# Patient Record
Sex: FEMALE | Race: OTHER | Employment: OTHER | URBAN - METROPOLITAN AREA
[De-identification: names, ages, dates, MRNs, and addresses within clinical notes are randomized per-mention and may not be internally consistent; named-entity substitution may affect disease eponyms.]

---

## 2018-03-17 NOTE — PATIENT DISCUSSION
3/20/18 pt was unable to fill lotemax (not on ins formulary), unable to get FML ([facility] states it is backordered), so sub PF1%. Taper as dir.

## 2018-03-17 NOTE — PATIENT DISCUSSION
Recommended artificial tears to use: 1 drop 4x a day in both eyes. Sample Refresh [place] 3 PF drops given.

## 2018-03-17 NOTE — PATIENT DISCUSSION
Some inflammation. Begin Lotemax TID x 1 wk, then BID x 1 wk. Recommend UV protection to prevent growth. Monitor clinically.

## 2018-09-18 NOTE — PATIENT DISCUSSION
RTC 6 mo HVF, IOP ck. Attempted to do [facility] OCT today but was unable due to technical error. Hx of WNL CCT, IOP.

## 2018-09-18 NOTE — PATIENT DISCUSSION
Recommended artificial tears to use: 1 drop 4x a day in both eyes. Sample Refresh [place] 3 PF drops given.

## 2019-03-25 NOTE — PATIENT DISCUSSION
Recommended artificial tears to use: 1 drop 4x a day in both eyes. Sample Refresh [place] 3 PF drops given.

## 2019-04-26 NOTE — PATIENT DISCUSSION
Recommended artificial tears to use: 1 drop 4x a day in both eyes. Sample Refresh [place] 3 PF drops given.

## 2020-05-11 NOTE — PATIENT DISCUSSION
Recommended artificial tears to use: 1 drop 4x a day in both eyes. Sample Refresh [place] 3 PF drops given.

## 2021-10-22 ENCOUNTER — NEW PATIENT EMERGENCY (OUTPATIENT)
Dept: URBAN - METROPOLITAN AREA CLINIC 47 | Facility: CLINIC | Age: 61
End: 2021-10-22

## 2021-10-22 DIAGNOSIS — H47.012: ICD-10-CM

## 2021-10-22 DIAGNOSIS — M31.6: ICD-10-CM

## 2021-10-22 PROCEDURE — 92083 EXTENDED VISUAL FIELD XM: CPT

## 2021-10-22 PROCEDURE — 92250 FUNDUS PHOTOGRAPHY W/I&R: CPT

## 2021-10-22 PROCEDURE — 92004 COMPRE OPH EXAM NEW PT 1/>: CPT

## 2021-10-22 PROCEDURE — 92134 CPTRZ OPH DX IMG PST SGM RTA: CPT

## 2021-10-22 ASSESSMENT — VISUAL ACUITY
OD_CC: 20/25-1
OS_CC: 20/30-2

## 2022-04-26 ENCOUNTER — WEB ENCOUNTER (OUTPATIENT)
Dept: URBAN - METROPOLITAN AREA CLINIC 107 | Facility: CLINIC | Age: 62
End: 2022-04-26

## 2022-05-02 ENCOUNTER — OFFICE VISIT (OUTPATIENT)
Dept: URBAN - METROPOLITAN AREA CLINIC 107 | Facility: CLINIC | Age: 62
End: 2022-05-02
Payer: MEDICARE

## 2022-05-02 ENCOUNTER — LAB OUTSIDE AN ENCOUNTER (OUTPATIENT)
Dept: URBAN - METROPOLITAN AREA CLINIC 107 | Facility: CLINIC | Age: 62
End: 2022-05-02

## 2022-05-02 VITALS
RESPIRATION RATE: 18 BRPM | HEART RATE: 87 BPM | HEIGHT: 66 IN | SYSTOLIC BLOOD PRESSURE: 191 MMHG | BODY MASS INDEX: 21.38 KG/M2 | WEIGHT: 133 LBS | TEMPERATURE: 97.1 F | DIASTOLIC BLOOD PRESSURE: 128 MMHG

## 2022-05-02 DIAGNOSIS — D64.9 ANEMIA, UNSPECIFIED TYPE: ICD-10-CM

## 2022-05-02 DIAGNOSIS — K59.09 OTHER CONSTIPATION: ICD-10-CM

## 2022-05-02 DIAGNOSIS — Z12.11 COLON CANCER SCREENING: ICD-10-CM

## 2022-05-02 PROCEDURE — 99203 OFFICE O/P NEW LOW 30 MIN: CPT | Performed by: INTERNAL MEDICINE

## 2022-05-02 RX ORDER — DOCUSATE SODIUM 100 MG/1
1 CAPSULE AS NEEDED CAPSULE ORAL ONCE A DAY
Qty: 30 CAPSULE | Refills: 1 | OUTPATIENT
Start: 2022-05-02 | End: 2022-07-01

## 2022-05-02 RX ORDER — DICYCLOMINE HYDROCHLORIDE 10 MG/1
1 TABLET CAPSULE ORAL AS NEEDED
Status: ACTIVE | COMMUNITY

## 2022-05-02 RX ORDER — ARIPIPRAZOLE 2 MG/1
1 TABLET TABLET ORAL ONCE A DAY
Status: ACTIVE | COMMUNITY

## 2022-05-02 RX ORDER — CLONAZEPAM 1 MG/1
1 TABLET TABLET ORAL ONCE A DAY
Status: ACTIVE | COMMUNITY

## 2022-05-02 RX ORDER — FAMOTIDINE 20 MG/1
1 TABLET AT BEDTIME AS NEEDED TABLET, FILM COATED ORAL TWICE A DAY
Status: ACTIVE | COMMUNITY

## 2022-05-02 RX ORDER — GABAPENTIN 300 MG/1
1 CAPSULE CAPSULE ORAL TWICE A DAY
Status: ACTIVE | COMMUNITY

## 2022-05-02 RX ORDER — BUTALBITAL, ACETAMINOPHEN, AND CAFFEINE 50; 300; 40 MG/1; MG/1; MG/1
1 CAPSULE AS NEEDED CAPSULE ORAL
Status: ACTIVE | COMMUNITY

## 2022-05-02 RX ORDER — RISEDRONATE SODIUM 150 MG/1
1 TABLET AT LEAST 30 MINUTES BEFORE THE FIRST FOOD OR DRINK, OTHER THAN WATER, OF THE DAY TABLET, FILM COATED ORAL
Status: ACTIVE | COMMUNITY

## 2022-05-02 RX ORDER — PREDNISONE 20 MG/1
1 TABLET TABLET ORAL ONCE A DAY
Status: ACTIVE | COMMUNITY

## 2022-05-02 RX ORDER — MULTIVIT-MIN/IRON/FOLIC ACID/K 18-600-40
AS DIRECTED CAPSULE ORAL
Status: ACTIVE | COMMUNITY

## 2022-05-02 RX ORDER — FOLIC ACID 1 MG/1
2 TALETS TABLET ORAL ONCE A DAY
Status: ACTIVE | COMMUNITY

## 2022-05-02 RX ORDER — MULTIVITAMIN
1 TABLET TABLET ORAL ONCE A DAY
Status: ACTIVE | COMMUNITY

## 2022-05-02 RX ORDER — HYDROCODONE BITARTRATE AND ACETAMINOPHEN 10; 325 MG/1; MG/1
1 TABLET AS NEEDED TABLET ORAL
Status: ACTIVE | COMMUNITY

## 2022-05-02 RX ORDER — DULOXETINE 30 MG/1
1 CAPSULE CAPSULE, DELAYED RELEASE PELLETS ORAL ONCE A DAY
Status: ACTIVE | COMMUNITY

## 2022-05-02 RX ORDER — BIOTIN 1000 MCG
AS DIRECTED TABLET,CHEWABLE ORAL
Status: ACTIVE | COMMUNITY

## 2022-05-02 NOTE — HPI-TODAY'S VISIT:
Patient is here today to establish care.  She states that she is due for colonoscopy.  Last one was many years ago.  Her main GI complaint is constipation.  She has hard stools.  She is taking dandelion seed as a holistic treatment.  She has a bowel movement every day.  There is no blood in the stool.  No family history of colon cancer.  The patient was also found to be anemic.  She had hemoglobin 8.4 with an MCV of 86 and a normal platelet count.  She has a history of rheumatoid arthritis and is on methotrexate.  She has been on iron in the past but is not on currently as she tells me that her iron levels are good.

## 2022-06-21 ENCOUNTER — OFFICE VISIT (OUTPATIENT)
Dept: URBAN - METROPOLITAN AREA SURGERY CENTER 25 | Facility: SURGERY CENTER | Age: 62
End: 2022-06-21
Payer: MEDICARE

## 2022-06-21 DIAGNOSIS — Z12.11 COLON CANCER SCREENING: ICD-10-CM

## 2022-06-21 PROCEDURE — G0121 COLON CA SCRN NOT HI RSK IND: HCPCS | Performed by: INTERNAL MEDICINE

## 2022-06-21 PROCEDURE — G8907 PT DOC NO EVENTS ON DISCHARG: HCPCS | Performed by: INTERNAL MEDICINE

## 2022-06-21 RX ORDER — PREDNISONE 20 MG/1
1 TABLET TABLET ORAL ONCE A DAY
Status: ACTIVE | COMMUNITY

## 2022-06-21 RX ORDER — DOCUSATE SODIUM 100 MG/1
1 CAPSULE AS NEEDED CAPSULE ORAL ONCE A DAY
Qty: 30 CAPSULE | Refills: 1 | Status: ACTIVE | COMMUNITY
Start: 2022-05-02 | End: 2022-07-01

## 2022-06-21 RX ORDER — ARIPIPRAZOLE 2 MG/1
1 TABLET TABLET ORAL ONCE A DAY
Status: ACTIVE | COMMUNITY

## 2022-06-21 RX ORDER — FAMOTIDINE 20 MG/1
1 TABLET AT BEDTIME AS NEEDED TABLET, FILM COATED ORAL TWICE A DAY
Status: ACTIVE | COMMUNITY

## 2022-06-21 RX ORDER — MULTIVITAMIN
1 TABLET TABLET ORAL ONCE A DAY
Status: ACTIVE | COMMUNITY

## 2022-06-21 RX ORDER — HYDROCODONE BITARTRATE AND ACETAMINOPHEN 10; 325 MG/1; MG/1
1 TABLET AS NEEDED TABLET ORAL
Status: ACTIVE | COMMUNITY

## 2022-06-21 RX ORDER — RISEDRONATE SODIUM 150 MG/1
1 TABLET AT LEAST 30 MINUTES BEFORE THE FIRST FOOD OR DRINK, OTHER THAN WATER, OF THE DAY TABLET, FILM COATED ORAL
Status: ACTIVE | COMMUNITY

## 2022-06-21 RX ORDER — GABAPENTIN 300 MG/1
1 CAPSULE CAPSULE ORAL TWICE A DAY
Status: ACTIVE | COMMUNITY

## 2022-06-21 RX ORDER — DICYCLOMINE HYDROCHLORIDE 10 MG/1
1 TABLET CAPSULE ORAL AS NEEDED
Status: ACTIVE | COMMUNITY

## 2022-06-21 RX ORDER — BUTALBITAL, ACETAMINOPHEN, AND CAFFEINE 50; 300; 40 MG/1; MG/1; MG/1
1 CAPSULE AS NEEDED CAPSULE ORAL
Status: ACTIVE | COMMUNITY

## 2022-06-21 RX ORDER — CLONAZEPAM 1 MG/1
1 TABLET TABLET ORAL ONCE A DAY
Status: ACTIVE | COMMUNITY

## 2022-06-21 RX ORDER — FOLIC ACID 1 MG/1
2 TALETS TABLET ORAL ONCE A DAY
Status: ACTIVE | COMMUNITY

## 2022-06-21 RX ORDER — MULTIVIT-MIN/IRON/FOLIC ACID/K 18-600-40
AS DIRECTED CAPSULE ORAL
Status: ACTIVE | COMMUNITY

## 2022-06-21 RX ORDER — BIOTIN 1000 MCG
AS DIRECTED TABLET,CHEWABLE ORAL
Status: ACTIVE | COMMUNITY

## 2022-06-21 RX ORDER — DULOXETINE 30 MG/1
1 CAPSULE CAPSULE, DELAYED RELEASE PELLETS ORAL ONCE A DAY
Status: ACTIVE | COMMUNITY

## 2022-06-29 PROBLEM — 305058001: Status: ACTIVE | Noted: 2022-05-19

## 2024-04-22 ENCOUNTER — LAB (OUTPATIENT)
Dept: URBAN - METROPOLITAN AREA CLINIC 113 | Facility: CLINIC | Age: 64
End: 2024-04-22

## 2024-04-22 ENCOUNTER — OV EP (OUTPATIENT)
Dept: URBAN - METROPOLITAN AREA CLINIC 113 | Facility: CLINIC | Age: 64
End: 2024-04-22
Payer: MEDICARE

## 2024-04-22 VITALS
TEMPERATURE: 97.3 F | HEIGHT: 66 IN | RESPIRATION RATE: 20 BRPM | DIASTOLIC BLOOD PRESSURE: 71 MMHG | HEART RATE: 50 BPM | SYSTOLIC BLOOD PRESSURE: 86 MMHG

## 2024-04-22 DIAGNOSIS — K21.9 CHRONIC GERD: ICD-10-CM

## 2024-04-22 DIAGNOSIS — R10.13 EPIGASTRIC PAIN: ICD-10-CM

## 2024-04-22 PROBLEM — 235595009: Status: ACTIVE | Noted: 2024-04-22

## 2024-04-22 PROCEDURE — 99214 OFFICE O/P EST MOD 30 MIN: CPT | Performed by: INTERNAL MEDICINE

## 2024-04-22 RX ORDER — MULTIVIT-MIN/IRON/FOLIC ACID/K 18-600-40
AS DIRECTED CAPSULE ORAL
Status: ACTIVE | COMMUNITY

## 2024-04-22 RX ORDER — LANSOPRAZOLE 30 MG/1
1 CAPSULE BEFORE A MEAL CAPSULE, DELAYED RELEASE ORAL ONCE A DAY
Status: ACTIVE | COMMUNITY
Start: 2024-04-22

## 2024-04-22 RX ORDER — BIOTIN 1000 MCG
AS DIRECTED TABLET,CHEWABLE ORAL
Status: ACTIVE | COMMUNITY

## 2024-04-22 RX ORDER — DULOXETINE 30 MG/1
1 CAPSULE CAPSULE, DELAYED RELEASE PELLETS ORAL ONCE A DAY
Status: ACTIVE | COMMUNITY

## 2024-04-22 RX ORDER — GABAPENTIN 300 MG/1
1 CAPSULE CAPSULE ORAL TWICE A DAY
Status: ACTIVE | COMMUNITY

## 2024-04-22 RX ORDER — FAMOTIDINE 20 MG/1
1 TABLET AT BEDTIME AS NEEDED TABLET, FILM COATED ORAL TWICE A DAY
Status: ACTIVE | COMMUNITY

## 2024-04-22 RX ORDER — HYDROCODONE BITARTRATE AND ACETAMINOPHEN 10; 325 MG/1; MG/1
1 TABLET AS NEEDED TABLET ORAL
Status: ACTIVE | COMMUNITY

## 2024-04-22 RX ORDER — CLONAZEPAM 1 MG/1
1 TABLET TABLET ORAL ONCE A DAY
Status: ACTIVE | COMMUNITY

## 2024-04-22 RX ORDER — BUTALBITAL, ACETAMINOPHEN, AND CAFFEINE 50; 300; 40 MG/1; MG/1; MG/1
1 CAPSULE AS NEEDED CAPSULE ORAL
Status: ACTIVE | COMMUNITY

## 2024-04-22 RX ORDER — PREDNISONE 5 MG/1
1.5 TABLET TABLET ORAL ONCE A DAY
Status: ACTIVE | COMMUNITY

## 2024-04-22 RX ORDER — ARIPIPRAZOLE 2 MG/1
1 TABLET TABLET ORAL ONCE A DAY
Status: ACTIVE | COMMUNITY

## 2024-04-22 RX ORDER — RISEDRONATE SODIUM 150 MG/1
1 TABLET AT LEAST 30 MINUTES BEFORE THE FIRST FOOD OR DRINK, OTHER THAN WATER, OF THE DAY TABLET, FILM COATED ORAL
Status: ACTIVE | COMMUNITY

## 2024-04-22 RX ORDER — MULTIVITAMIN
1 TABLET TABLET ORAL ONCE A DAY
Status: ACTIVE | COMMUNITY

## 2024-04-22 RX ORDER — FOLIC ACID 1 MG/1
2 TALETS TABLET ORAL ONCE A DAY
Status: ACTIVE | COMMUNITY

## 2024-04-22 RX ORDER — DICYCLOMINE HYDROCHLORIDE 10 MG/1
1 TABLET CAPSULE ORAL AS NEEDED
Status: ACTIVE | COMMUNITY

## 2024-04-22 NOTE — HPI-TODAY'S VISIT:
Patient presents today with new complaints of epigastric pain and reflux.  This been going on for few weeks.  Severe enough to go to the ER.  She was worked up for potential blood clot at that time with a CTA.  This was negative for pulmonary embolus.  She did notably have some mild new groundglass opacities in the mid to lower lung zones bilaterally.  She has an extensive history of peripheral arterial disease with multiple surgeries.  She is on steroids for giant cell arteritis.  She takes Plavix.  Denies any other blood thinning medication currently.  She is not having any active chest pain or shortness of breath.  She is able to ambulate.  Most recent white count 3.6 hemoglobin 1.9 plate count of 240 with normal LFTs.  She was started on lansoprazole just recently by her primary care physician. She also has had problems in the past of coughing up blood.  She has not seen a pulmonologist recently.  She thought maybe this was related to her GI tract.

## 2024-04-22 NOTE — PHYSICAL EXAM GASTROINTESTINAL
Abdomen is soft, mild epigastric tenderness, nondistended , no rebound or guarding. No organomegaly appreciated

## 2024-05-16 ENCOUNTER — CLAIMS CREATED FROM THE CLAIM WINDOW (OUTPATIENT)
Dept: URBAN - METROPOLITAN AREA SURGERY CENTER 25 | Facility: SURGERY CENTER | Age: 64
End: 2024-05-16

## 2024-05-16 ENCOUNTER — TELEPHONE ENCOUNTER (OUTPATIENT)
Dept: URBAN - METROPOLITAN AREA CLINIC 113 | Facility: CLINIC | Age: 64
End: 2024-05-16

## 2024-05-16 RX ORDER — BUTALBITAL, ACETAMINOPHEN, AND CAFFEINE 50; 300; 40 MG/1; MG/1; MG/1
1 CAPSULE AS NEEDED CAPSULE ORAL
Status: ACTIVE | COMMUNITY

## 2024-05-16 RX ORDER — DICYCLOMINE HYDROCHLORIDE 10 MG/1
1 TABLET CAPSULE ORAL AS NEEDED
Status: ACTIVE | COMMUNITY

## 2024-05-16 RX ORDER — RISEDRONATE SODIUM 150 MG/1
1 TABLET AT LEAST 30 MINUTES BEFORE THE FIRST FOOD OR DRINK, OTHER THAN WATER, OF THE DAY TABLET, FILM COATED ORAL
Status: ACTIVE | COMMUNITY

## 2024-05-16 RX ORDER — BIOTIN 1000 MCG
AS DIRECTED TABLET,CHEWABLE ORAL
Status: ACTIVE | COMMUNITY

## 2024-05-16 RX ORDER — PREDNISONE 5 MG/1
1.5 TABLET TABLET ORAL ONCE A DAY
Status: ACTIVE | COMMUNITY

## 2024-05-16 RX ORDER — MULTIVITAMIN
1 TABLET TABLET ORAL ONCE A DAY
Status: ACTIVE | COMMUNITY

## 2024-05-16 RX ORDER — HYDROCODONE BITARTRATE AND ACETAMINOPHEN 10; 325 MG/1; MG/1
1 TABLET AS NEEDED TABLET ORAL
Status: ACTIVE | COMMUNITY

## 2024-05-16 RX ORDER — CLONAZEPAM 1 MG/1
1 TABLET TABLET ORAL ONCE A DAY
Status: ACTIVE | COMMUNITY

## 2024-05-16 RX ORDER — FAMOTIDINE 20 MG/1
1 TABLET AT BEDTIME AS NEEDED TABLET, FILM COATED ORAL TWICE A DAY
Status: ACTIVE | COMMUNITY

## 2024-05-16 RX ORDER — GABAPENTIN 300 MG/1
1 CAPSULE CAPSULE ORAL TWICE A DAY
Status: ACTIVE | COMMUNITY

## 2024-05-16 RX ORDER — MULTIVIT-MIN/IRON/FOLIC ACID/K 18-600-40
AS DIRECTED CAPSULE ORAL
Status: ACTIVE | COMMUNITY

## 2024-05-16 RX ORDER — ARIPIPRAZOLE 2 MG/1
1 TABLET TABLET ORAL ONCE A DAY
Status: ACTIVE | COMMUNITY

## 2024-05-16 RX ORDER — LANSOPRAZOLE 30 MG/1
1 CAPSULE BEFORE A MEAL CAPSULE, DELAYED RELEASE ORAL ONCE A DAY
Status: ACTIVE | COMMUNITY
Start: 2024-04-22

## 2024-05-16 RX ORDER — FOLIC ACID 1 MG/1
2 TALETS TABLET ORAL ONCE A DAY
Status: ACTIVE | COMMUNITY

## 2024-05-16 RX ORDER — DULOXETINE 30 MG/1
1 CAPSULE CAPSULE, DELAYED RELEASE PELLETS ORAL ONCE A DAY
Status: ACTIVE | COMMUNITY

## 2024-06-10 ENCOUNTER — OFFICE VISIT (OUTPATIENT)
Dept: URBAN - METROPOLITAN AREA CLINIC 113 | Facility: CLINIC | Age: 64
End: 2024-06-10

## 2024-10-02 ENCOUNTER — TELEPHONE ENCOUNTER (OUTPATIENT)
Dept: URBAN - METROPOLITAN AREA CLINIC 113 | Facility: CLINIC | Age: 64
End: 2024-10-02